# Patient Record
Sex: FEMALE
[De-identification: names, ages, dates, MRNs, and addresses within clinical notes are randomized per-mention and may not be internally consistent; named-entity substitution may affect disease eponyms.]

---

## 2021-11-27 NOTE — EDM.PDOC
ED HPI GENERAL MEDICAL PROBLEM





- General


Chief Complaint: Lower Extremity Injury/Pain


Stated Complaint: foot pain


Time Seen by Provider: 11/27/21 22:30


Source of Information: Reports: Patient, Family





- History of Present Illness


INITIAL COMMENTS - FREE TEXT/NARRATIVE: 


Shirley is a 66 year old female that presents to the ED with her  with 

complaints of left foot pain. States that she fell while helping her  

install a window at about 5pm. Was able to walk on the left lower extremity 

without pain until she went to bed around 8pm.  She began having pain in the 

foot while in bed and states pain is now significantly worse with weight 

bearing. Is able to move her foot and ankle with more significant pain to the 

top and lateral side of her foot. Denies any other injuries and states she did n

ot hit her head or lose consciousness.  GCS 15. A&O x 4.








- Related Data


                                    Allergies











Allergy/AdvReac Type Severity Reaction Status Date / Time


 


Penicillins Allergy  Cannot Verified 12/09/19 08:03





   Remember  











Home Meds: 


                                    Home Meds





Multivitamin [Multi-Vitamin Daily] 1 tab PO DAILY 12/09/19 [History]


Omega-3S/DHA/Epa/Fish Oil [Fish Oil Omega-3 Softgel] 1 cap PO DAILY 12/09/19 

[History]


Meclizine [Antivert] 25 mg PO TID #30 tab 12/10/19 [Rx]


metFORMIN [Glucophage XR] 500 mg PO DAILY #30 tab.er 12/10/19 [Rx]


Rosuvastatin [Crestor] 10 mg PO DAILY 11/27/21 [History]











Past Medical History


Genitourinary History: Reports: Renal Calculus





- Past Surgical History


Female  Surgical History: Reports: Breast Biopsy





Social & Family History





- Caffeine Use


Caffeine Use: Reports: Coffee





ED ROS GENERAL





- Review of Systems


Review Of Systems: See Below


Constitutional: Reports: No Symptoms


HEENT: Reports: No Symptoms


Respiratory: Reports: No Symptoms


Cardiovascular: Reports: No Symptoms


Endocrine: Reports: No Symptoms


GI/Abdominal: Reports: No Symptoms


: Reports: No Symptoms


Musculoskeletal: Reports: Foot Pain.  Denies: Neck Pain


Skin: Reports: Bruising (left foot)


Neurological: Reports: No Symptoms


Psychiatric: Reports: No Symptoms





ED EXAM, GENERAL





- Physical Exam


Exam: See Below


Exam Limited By: No Limitations


General Appearance: Alert, WD/WN, Mild Distress


Head: Atraumatic, Normocephalic


Neck: Normal Inspection, Full Range of Motion


Respiratory/Chest: No Respiratory Distress


Cardiovascular: Normal Peripheral Pulses, No Edema


Extremities: Normal Capillary Refill, Other (left foot pain, bruising)


Neurological: Alert, Oriented


Psychiatric: Normal Affect, Normal Mood


Skin Exam: Warm, Dry, Intact, Ecchymosis (left foot)





Course





- Vital Signs


Last Recorded V/S: 





                                Last Vital Signs











Temp  97.2 F   11/27/21 22:10


 


Pulse  88   11/27/21 22:10


 


Resp  18   11/27/21 22:10


 


BP  112/75   11/27/21 22:10


 


Pulse Ox  97   11/27/21 22:10














- Orders/Labs/Meds


Orders: 





                               Active Orders 24 hr











 Category Date Time Status


 


 Foot 2V Lt [CR] Stat Exams  11/27/21 22:16 Stop Req


 


 Foot Comp Min 3V Lt [CR] Stat Exams  11/27/21 22:26 Ordered














- Re-Assessments/Exams


Free Text/Narrative Re-Assessment/Exam: 


X-ray of the left foot obtained. I reviewed the x-ray and see no evidence of 

acute fracture at this time. Pain is illicited with inversion of the left foot. 

Ecchymosis noted to the lateral anterior aspect of the foot. CMS is intact x 4 

and patient does have full range of motion of the digits and ankle. Applied ace 

bandage for support. Discussed RICE: Rest, Ice, Compress, and Elevate with the 

patient and OTC pain medication. 








Departure





- Departure


Time of Disposition: 22:35


Disposition: Home, Self-Care 01


Condition: Good


Clinical Impression: 


 Sprain








- Discharge Information


*PRESCRIPTION DRUG MONITORING PROGRAM REVIEWED*: Not Applicable


*COPY OF PRESCRIPTION DRUG MONITORING REPORT IN PATIENT [facility]: Not Applicable


Instructions:  Foot Sprain


Additional Instructions: 


1. Apply ice to the left foot for 20 minutes four times per day for the next 24-

48 hours.


2. Elevate the extremity at or above the level of the heart.


3. Use ace bandage for compression as needed.


4. May take Tylenol or ibuprofen as directed on the bottle.


5. Call or return if symptoms worsen. Follow-up at clinic as needed.





Sepsis Event Note (ED)





- Evaluation


Sepsis Screening Result: No Definite Risk





- Focused Exam


Vital Signs: 





                                   Vital Signs











  Temp Pulse Resp BP Pulse Ox


 


 11/27/21 22:10  97.2 F  88  18  112/75  97

## 2022-01-10 NOTE — EDM.PDOC
ED HPI GENERAL MEDICAL PROBLEM





- General


Chief Complaint: General


Stated Complaint: not feeling well


Time Seen by Provider: 01/10/22 20:45


Source of Information: Reports: Patient


History Limitations: Reports: No Limitations





- History of Present Illness


INITIAL COMMENTS - FREE TEXT/NARRATIVE: 


Mrs. Narciso Palomares is a 66-year-old female patient that presented to the emergency

department with ongoing symptoms after testing positive for COVID December 29, 2021.  Reports that she has had COVID for about 10 days and believes that she 

should be feeling better.  She continues to have a cough and shortness of breath

with activity.  Also reported nasal congestion.  She also states that she has 

some pressure in her chest with deep breath and coughing.  She is alert and 

oriented and denies sore throat, headache, abdominal pain, dysuria, or diarrhea.

 She continues to feel fatigued and just not feeling well.  Reports her last 

fever was about 10 days ago stating it was 99.7.  She has been checking her 

oxygen levels at home and they have been in the upper 90s.





Onset: Gradual


Onset Date: 12/29/21


Quality: Reports: Pressure


Severity: Mild


Improves with: Reports: None


Worsens with: Reports: None


Associated Symptoms: Reports: Chest Pain (pressure with deep breaths and cough),

Cough, Shortness of Breath


Treatments PTA: Reports: Acetaminophen





- Related Data


                                    Allergies











Allergy/AdvReac Type Severity Reaction Status Date / Time


 


Penicillins Allergy  Cannot Verified 01/10/22 20:42





   Remember  











Home Meds: 


                                    Home Meds





Multivitamin [Multi-Vitamin Daily] 1 tab PO DAILY 12/09/19 [History]


Omega-3S/DHA/Epa/Fish Oil [Fish Oil Omega-3 Softgel] 1 cap PO DAILY 12/09/19 

[History]


Meclizine [Antivert] 25 mg PO TID #30 tab 12/10/19 [Rx]


metFORMIN [Glucophage XR] 500 mg PO DAILY #30 tab.er 12/10/19 [Rx]


Rosuvastatin [Crestor] 10 mg PO DAILY 11/27/21 [History]











Past Medical History


Cardiovascular History: Reports: High Cholesterol


Genitourinary History: Reports: Renal Calculus





- Past Surgical History


Female  Surgical History: Reports: Breast Biopsy





Social & Family History





- Family History


Family Medical History: No Pertinent Family History





- Tobacco Use


Tobacco Use Status *Q: Never Tobacco User


Second Hand Smoke Exposure: No





- Caffeine Use


Caffeine Use: Reports: None





ED ROS GENERAL





- Review of Systems


Review Of Systems: See Below


Constitutional: Reports: Fever (last fever 7 days ago).  Denies: Chills


HEENT: Reports: Sinus Problem


Respiratory: Reports: Shortness of Breath, Cough.  Denies: Sputum


Cardiovascular: Reports: Chest Pain (chest pressure with deep breaths), Dyspnea 

on Exertion.  Denies: Lightheadedness, Palpitations, Syncope


Endocrine: Reports: No Symptoms


GI/Abdominal: Denies: Abdominal Pain, Constipation, Diarrhea, Decreased Appetite


: Denies: Dysuria, Flank Pain, Frequency, Urgency


Musculoskeletal: Reports: No Symptoms


Skin: Reports: No Symptoms


Neurological: Denies: Confusion, Dizziness, Headache


Psychiatric: Reports: No Symptoms


Hematologic/Lymphatic: Reports: No Symptoms


Immunologic: Reports: No Symptoms





ED EXAM, GENERAL





- Physical Exam


Exam: See Below


Exam Limited By: No Limitations


General Appearance: Alert, WD/WN, No Apparent Distress


Ears: Normal External Exam, Hearing Grossly Normal


Nose: Normal Inspection, Normal Mucosa, Nasal Tenderness


Throat/Mouth: Normal Inspection, Normal Voice, No Airway Compromise


Head: Atraumatic, Normocephalic


Neck: Normal Inspection, Supple, Non-Tender


Respiratory/Chest: No Respiratory Distress, Lungs Clear


Cardiovascular: Normal Peripheral Pulses, Regular Rate, Rhythm, No Edema, No 

Gallop, No Murmur, No Rub


GI/Abdominal: Normal Bowel Sounds, Soft, Non-Tender


 (Female) Exam: Deferred


Rectal (Female) Exam: Deferred


Back Exam: Normal Inspection, Full Range of Motion


Extremities: Normal Inspection, No Pedal Edema


Neurological: Alert, Oriented, Normal Cognition


Psychiatric: Normal Affect, Normal Mood


Skin Exam: Warm, Dry, Intact


Lymphatic: No Adenopathy





Course





- Vital Signs


Last Recorded V/S: 


                                Last Vital Signs











Temp  97.1 F   01/10/22 20:42


 


Pulse  101 H  01/10/22 20:42


 


Resp  18   01/10/22 20:42


 


BP  148/88 H  01/10/22 20:42


 


Pulse Ox  97   01/10/22 20:42














- Orders/Labs/Meds


Orders: 


                               Active Orders 24 hr











 Category Date Time Status


 


 Chest 2V [CR] Stat Exams  01/10/22 21:27 Ordered











Labs: 


                                Laboratory Tests











  01/10/22 01/10/22 01/10/22 Range/Units





  21:05 21:05 21:05 


 


WBC  9.5    (4.0-11.0)  10^3/uL


 


RBC  4.33    (4.00-5.50)  x10^6/uL


 


Hgb  13.5    (12.0-16.0)  g/dL


 


Hct  38.6    (37.0-47.0)  %


 


MCV  89.1    (83.0-97.0)  fL


 


MCH  31.2    (27.0-32.0)  pg


 


MCHC  35.0    (32.0-36.0)  g/dL


 


RDW Coeff of Madisyn  12.3    (11.0-15.0)  %


 


Plt Count  274    (150-400)  10^3/uL


 


Immature Gran % (Auto)  0.3    (0.0-4.9)  %


 


Neut % (Auto)  62.0    (41-71)  %


 


Lymph % (Auto)  23.5 L    (24-44)  %


 


Mono % (Auto)  8.5    (0-10)  %


 


Eos % (Auto)  5.0    (0-6)  %


 


Baso % (Auto)  0.7    (0-1)  %


 


Neut # (Auto)  5.90    (1.80-8.00)  x10^3/uL


 


Lymph # (Auto)  2.24    (0.60-5.00)  10^3/uL


 


Mono # (Auto)  0.81    (0.00-1.50)  10^3/uL


 


Eos # (Auto)  0.48    (0.00-1.50)  10^3/uL


 


Baso # (Auto)  0.07    (0.00-0.50)  10^3/uL


 


Immature Gran # (Auto)  0.03    (0.00-0.49)  10^3/uL


 


D-Dimer, Quantitative   0.19   (0.00-0.50)  


 


Sodium    130 L  (136-145)  mEq/L


 


Potassium    3.3 L D  (3.5-5.0)  mEq/L


 


Chloride    100  ()  mEq/L


 


Carbon Dioxide    29  (21-32)  mmol/L


 


BUN    11  (7-18)  mg/dL


 


Creatinine    1.1 H  (0.6-1.0)  mg/dL


 


Est Cr Clr Drug Dosing    35.65  mL/min


 


Estimated GFR (MDRD)    50 L  (>=60)  mL/min


 


Glucose    227 H D  (75-99)  mg/dL


 


Calcium    9.6  (8.4-10.1)  mg/dL


 


Total Bilirubin    0.7  (0.0-1.0)  mg/dL


 


AST    72 H  (15-37)  U/L


 


ALT    150 H  (12-78)  U/L


 


Alkaline Phosphatase    132 H  ()  U/L


 


C-Reactive Protein    < 0.2 L  (0.2-0.8)  mg/dL


 


Total Protein    7.5  (6.4-8.2)  g/dL


 


Albumin    3.8  (3.4-5.0)  g/dL














- Re-Assessments/Exams


Free Text/Narrative Re-Assessment/Exam: 


Mrs. Narciso Palomares is a 66-year-old female that presented to the emergency 

department with continued symptoms after testing positive for COVID-19 

approximately 10 days ago.  She reported a continued cough, exertional dyspnea, 

and chest pressure with deep breath and cough.  Labs were obtained to include a 

CBC, CMP, CRP and a D-dimer.  CBC and CRP unremarkable.  CMP shows sodium level 

at 130 and potassium at 3.3.  AST and ALT are both elevated AST 72 .  

Glucose at 227.  A two-view chest x-ray was obtained and which I personally 

reviewed and see no obvious consolidation or infiltrate.





Plan is to discharge patient home.  Discussed with patient that COVID-19 

symptoms can persist for several days to weeks.  Patient may use 

over-the-counter cough medications if needed.  Encourage patient to drink plenty

 of fluids and suggested that she may use fluids with electrolytes such as 

Pedialyte or Gatorade.  Also explained to patient that many sports drinks are 

high in sugar so she should be aware that this may affect her blood sugars.  

Patient may follow-up with her primary care provider as needed and should have 

her labs reassessed when symptoms improve.  Patient may call or return if any 

concerns or questions or symptoms worsen.





Departure





- Departure


Time of Disposition: 21:53


Disposition: Home, Self-Care 01


Condition: Fair


Clinical Impression: 


 COVID-19, Cough in adult








- Discharge Information


*PRESCRIPTION DRUG MONITORING PROGRAM REVIEWED*: Not Applicable


*COPY OF PRESCRIPTION DRUG MONITORING REPORT IN PATIENT [facility]: Not Applicable


Instructions:  COVID-19 Frequently Asked Questions, Cough, Adult, Easy-to-Read, 

10 Things You Can Do to Manage Your COVID-19 Symptoms at Home - [facility] (07/16/2021)


Forms:  ED Department Discharge


Additional Instructions: 


1.  COVID-19 symptoms can linger longer than 10 days.  It may take several days 

to weeks for you to feel 100%.


2.  Stay hydrated by drinking plenty of fluids.  It is okay to drink water and 

drinks with the electrolytes such as Pedialyte or Gatorade.


3.  May use Tylenol or ibuprofen for fever or discomfort.


4.  You may try over-the-counter cough medication as needed.


5.  You may follow-up with your primary care provider as needed.


6.  Call or return with any questions.  If your symptoms become worse please 

return to the emergency department.





Sepsis Event Note (ED)





- Evaluation


Sepsis Screening Result: No Definite Risk





- Focused Exam


Vital Signs: 


                                   Vital Signs











  Temp Pulse Resp BP Pulse Ox


 


 01/10/22 20:42  97.1 F  101 H  18  148/88 H  97














- My Orders


Last 24 Hours: 


My Active Orders





01/10/22 21:27


Chest 2V [CR] Stat 














- Assessment/Plan


Last 24 Hours: 


My Active Orders





01/10/22 21:27


Chest 2V [CR] Stat

## 2023-01-31 ENCOUNTER — HOSPITAL ENCOUNTER (EMERGENCY)
Dept: HOSPITAL 38 - CC.ED | Age: 68
Discharge: HOME | End: 2023-01-31
Payer: MEDICARE

## 2023-01-31 DIAGNOSIS — Z88.0: ICD-10-CM

## 2023-01-31 DIAGNOSIS — E78.00: ICD-10-CM

## 2023-01-31 DIAGNOSIS — Z79.899: ICD-10-CM

## 2023-01-31 DIAGNOSIS — M62.838: Primary | ICD-10-CM

## 2023-01-31 RX ADMIN — ORPHENADRINE CITRATE ONE MG: 60 INJECTION INTRAMUSCULAR; INTRAVENOUS at 09:17

## 2023-01-31 RX ADMIN — KETOROLAC TROMETHAMINE ONE MG: 30 INJECTION, SOLUTION INTRAMUSCULAR at 09:17

## 2025-05-23 ENCOUNTER — HOSPITAL ENCOUNTER (EMERGENCY)
Dept: HOSPITAL 38 - CC.ED | Age: 70
Discharge: HOME | End: 2025-05-23
Payer: COMMERCIAL

## 2025-05-23 DIAGNOSIS — Z79.899: ICD-10-CM

## 2025-05-23 DIAGNOSIS — Z88.0: ICD-10-CM

## 2025-05-23 DIAGNOSIS — E78.00: ICD-10-CM

## 2025-05-23 DIAGNOSIS — H81.10: Primary | ICD-10-CM

## 2025-05-23 LAB
ALBUMIN SERPL-MCNC: 3.8 G/DL (ref 3.4–5)
APPEARANCE UR: CLEAR
BACTERIA URNS QL MICRO: (no result) /HPF
BASOPHILS NFR BLD AUTO: 1.1 % (ref 0–1)
BILIRUB SERPL-MCNC: 0.9 MG/DL (ref 0–1)
BILIRUB UR STRIP-MCNC: NEGATIVE MG/DL
CALCIUM SERPL-MCNC: 9.3 MG/DL (ref 8.4–10.1)
COLOR UR: YELLOW
CREAT CL 24H UR+SERPL-VRATE: 48.11 ML/MIN
CREAT SERPL-MCNC: 0.9 MG/DL (ref 0.6–1)
EOSINOPHIL # BLD AUTO: 0.54 10^3/UL (ref 0–1.5)
EOSINOPHIL NFR BLD AUTO: 5.7 % (ref 0–6)
EPI CELLS #/AREA URNS HPF: (no result) /HPF
GLUCOSE UR STRIP-MCNC: NEGATIVE MG/DL
HCT VFR BLD AUTO: 39.7 % (ref 37–47)
HGB BLD-MCNC: 13.4 G/DL (ref 12–16)
IMM GRANULOCYTES # BLD: 0.02 10^3/UL (ref 0–0.49)
IMM GRANULOCYTES NFR BLD: 0.2 % (ref 0–4.9)
KETONES UR STRIP-MCNC: NEGATIVE MG/DL
LYMPHOCYTES # BLD AUTO: 2.08 10^3/UL (ref 0.6–5)
LYMPHOCYTES NFR BLD AUTO: 22.1 % (ref 24–44)
MAGNESIUM SERPL-MCNC: 1.9 MG/DL (ref 1.8–2.4)
MCH RBC QN AUTO: 30.8 PG (ref 27–32)
MCHC RBC AUTO-ENTMCNC: 33.8 G/DL (ref 32–36)
MCHC RBC AUTO-ENTMCNC: 91.3 FL (ref 83–97)
MONOCYTES # BLD AUTO: 0.91 10^3/UL (ref 0–1.5)
MONOCYTES NFR BLD AUTO: 9.7 % (ref 0–10)
NEUTROPHILS # BLD AUTO: 5.76 X10^3/UL (ref 1.8–8)
NEUTROPHILS NFR BLD AUTO: 61.2 % (ref 41–71)
NITRITE UR QL: NEGATIVE
PH UR STRIP: 6.5 [PH] (ref 4.5–8)
PLATELET # BLD AUTO: 277 10^3/UL (ref 150–400)
POTASSIUM SERPL-SCNC: 4.4 MEQ/L (ref 3.5–5)
PROT SERPL-MCNC: 7.4 G/DL (ref 6.4–8.2)
PROT UR STRIP-MCNC: NEGATIVE MG/DL
RBC # BLD AUTO: 4.35 X10^6/UL (ref 4–5.5)
RBC # URNS HPF: (no result) /HPF (ref 0–5)
RBC UR QL: NEGATIVE
UROBILINOGEN UR STRIP-ACNC: 0.2 EU/DL (ref 0.2–1)
WBC # BLD AUTO: 9.4 10^3/UL (ref 4–11)